# Patient Record
Sex: MALE | HISPANIC OR LATINO | Employment: FULL TIME | URBAN - METROPOLITAN AREA
[De-identification: names, ages, dates, MRNs, and addresses within clinical notes are randomized per-mention and may not be internally consistent; named-entity substitution may affect disease eponyms.]

---

## 2024-02-08 ENCOUNTER — HOSPITAL ENCOUNTER (OUTPATIENT)
Dept: LAB | Facility: MEDICAL CENTER | Age: 19
End: 2024-02-08
Payer: COMMERCIAL

## 2024-02-08 ENCOUNTER — HOSPITAL ENCOUNTER (OUTPATIENT)
Facility: MEDICAL CENTER | Age: 19
End: 2024-02-08
Payer: COMMERCIAL

## 2024-02-08 ENCOUNTER — OFFICE VISIT (OUTPATIENT)
Dept: MEDICAL GROUP | Facility: CLINIC | Age: 19
End: 2024-02-08
Payer: COMMERCIAL

## 2024-02-08 VITALS
OXYGEN SATURATION: 98 % | TEMPERATURE: 97.6 F | HEART RATE: 90 BPM | BODY MASS INDEX: 26.84 KG/M2 | HEIGHT: 66 IN | WEIGHT: 167 LBS

## 2024-02-08 DIAGNOSIS — Z20.2 STD EXPOSURE: ICD-10-CM

## 2024-02-08 LAB
HCV AB SER QL: NORMAL
HIV 1+2 AB+HIV1 P24 AG SERPL QL IA: NORMAL

## 2024-02-08 PROCEDURE — 99213 OFFICE O/P EST LOW 20 MIN: CPT | Mod: GE

## 2024-02-08 PROCEDURE — 86803 HEPATITIS C AB TEST: CPT

## 2024-02-08 PROCEDURE — 87591 N.GONORRHOEAE DNA AMP PROB: CPT

## 2024-02-08 PROCEDURE — 36415 COLL VENOUS BLD VENIPUNCTURE: CPT

## 2024-02-08 PROCEDURE — 87389 HIV-1 AG W/HIV-1&-2 AB AG IA: CPT

## 2024-02-08 PROCEDURE — 87491 CHLMYD TRACH DNA AMP PROBE: CPT

## 2024-02-08 ASSESSMENT — PATIENT HEALTH QUESTIONNAIRE - PHQ9: CLINICAL INTERPRETATION OF PHQ2 SCORE: 0

## 2024-02-08 NOTE — PROGRESS NOTES
"Subjective:     CC: STD exposure    HPI:   Basil presents today regarding possible STD.  Patient reports that his current partner has tested positive twice now for chlamydia.  She has been treated both times.  There is concern that patient has chlamydia which she has been giving his patient twice as he has not had testing or recent treatment.  He denies any symptoms of genital lesions or urethral discharge.  Denies fever or chills.  He denies any other complaints at this time.    ROS:  Gen: no fevers/chills, no changes in weight  Pulm: no sob, no cough  CV: no chest pain, no palpitations  GI: no nausea/vomiting, no diarrhea    Objective:     Exam:  Pulse 90   Temp 36.4 °C (97.6 °F)   Ht 1.676 m (5' 6\")   Wt 75.8 kg (167 lb)   SpO2 98%   BMI 26.95 kg/m²  Body mass index is 26.95 kg/m².    Gen: Alert and oriented, No apparent distress.  Neck: Neck is supple without lymphadenopathy.  Lungs: Normal effort, CTA bilaterally, no wheezes, rhonchi, or rales  CV: Regular rate and rhythm. No murmurs, rubs, or gallops.  Ext: No clubbing, cyanosis, edema.    Assessment & Plan:     18 y.o. male with the following -     1. STD exposure  Patient with possible STD exposure.  Obtained labs including chlamydia, gonorrhea, HIV, hep C testing.  Discussed with patient once results have been obtained will inform patient and treat as needed.  Advised to use protection until results are known.  - Chlamydia/GC, PCR (Urine); Future  - HIV AG/AB COMBO ASSAY SCREENING; Future  - HEP C VIRUS ANTIBODY; Future     Follow-up as needed or for annual visit    Nahomy Jamison M.D.  PGY-2         "

## 2024-02-10 LAB
C TRACH DNA SPEC QL NAA+PROBE: POSITIVE
N GONORRHOEA DNA SPEC QL NAA+PROBE: NEGATIVE
SPECIMEN SOURCE: ABNORMAL

## 2024-02-12 ENCOUNTER — TELEPHONE (OUTPATIENT)
Dept: MEDICAL GROUP | Facility: CLINIC | Age: 19
End: 2024-02-12
Payer: COMMERCIAL

## 2024-02-12 DIAGNOSIS — Z20.2 STD EXPOSURE: ICD-10-CM

## 2024-02-12 RX ORDER — DOXYCYCLINE HYCLATE 100 MG
100 TABLET ORAL 2 TIMES DAILY
Qty: 14 TABLET | Refills: 0 | Status: SHIPPED | OUTPATIENT
Start: 2024-02-12 | End: 2024-02-19

## 2024-02-29 ENCOUNTER — TELEPHONE (OUTPATIENT)
Dept: MEDICAL GROUP | Facility: CLINIC | Age: 19
End: 2024-02-29
Payer: COMMERCIAL

## 2024-02-29 NOTE — TELEPHONE ENCOUNTER
----- Message from Nahomy Terry M.D. sent at 2/29/2024  8:43 AM PST -----  Regarding: RE: Results  Hello,     Yes I did attempt calling twice it is their home phone number.The mom kept calling but I told Glendy that I can not talk to her unless the patient signs a release of information.   ----- Message -----  From: Lazara Sagastume  Sent: 2/23/2024  10:03 AM PST  To: Nahomy Terry M.D.  Subject: Results                                          Hi , Dr terry  Results for Chlamydia are available in the chart. Can you Contac patient with Results.  Thank you !

## 2024-03-02 ENCOUNTER — APPOINTMENT (OUTPATIENT)
Dept: RADIOLOGY | Facility: MEDICAL CENTER | Age: 19
End: 2024-03-02
Attending: EMERGENCY MEDICINE
Payer: COMMERCIAL

## 2024-03-02 ENCOUNTER — HOSPITAL ENCOUNTER (EMERGENCY)
Facility: MEDICAL CENTER | Age: 19
End: 2024-03-02
Attending: EMERGENCY MEDICINE
Payer: COMMERCIAL

## 2024-03-02 VITALS
RESPIRATION RATE: 16 BRPM | TEMPERATURE: 98.2 F | SYSTOLIC BLOOD PRESSURE: 134 MMHG | WEIGHT: 167.11 LBS | HEIGHT: 71 IN | HEART RATE: 95 BPM | DIASTOLIC BLOOD PRESSURE: 72 MMHG | OXYGEN SATURATION: 95 % | BODY MASS INDEX: 23.4 KG/M2

## 2024-03-02 DIAGNOSIS — M25.571 ACUTE RIGHT ANKLE PAIN: ICD-10-CM

## 2024-03-02 DIAGNOSIS — M25.471 ANKLE EFFUSION, RIGHT: ICD-10-CM

## 2024-03-02 PROCEDURE — 73610 X-RAY EXAM OF ANKLE: CPT | Mod: RT

## 2024-03-02 PROCEDURE — 99283 EMERGENCY DEPT VISIT LOW MDM: CPT

## 2024-03-02 RX ORDER — NAPROXEN 500 MG/1
500 TABLET ORAL 2 TIMES DAILY WITH MEALS
Qty: 20 TABLET | Refills: 0 | Status: SHIPPED | OUTPATIENT
Start: 2024-03-02

## 2024-03-03 ENCOUNTER — HOSPITAL ENCOUNTER (EMERGENCY)
Facility: MEDICAL CENTER | Age: 19
End: 2024-03-03
Attending: EMERGENCY MEDICINE
Payer: COMMERCIAL

## 2024-03-03 VITALS
RESPIRATION RATE: 16 BRPM | OXYGEN SATURATION: 98 % | BODY MASS INDEX: 23.24 KG/M2 | WEIGHT: 166 LBS | SYSTOLIC BLOOD PRESSURE: 116 MMHG | TEMPERATURE: 98.1 F | HEIGHT: 71 IN | HEART RATE: 64 BPM | DIASTOLIC BLOOD PRESSURE: 70 MMHG

## 2024-03-03 VITALS
OXYGEN SATURATION: 96 % | RESPIRATION RATE: 15 BRPM | HEART RATE: 75 BPM | HEIGHT: 71 IN | TEMPERATURE: 98.4 F | WEIGHT: 166.01 LBS | BODY MASS INDEX: 23.24 KG/M2 | SYSTOLIC BLOOD PRESSURE: 121 MMHG | DIASTOLIC BLOOD PRESSURE: 82 MMHG

## 2024-03-03 DIAGNOSIS — G89.29 CHRONIC PAIN OF RIGHT ANKLE: ICD-10-CM

## 2024-03-03 DIAGNOSIS — M25.471 RIGHT ANKLE SWELLING: ICD-10-CM

## 2024-03-03 DIAGNOSIS — M25.471 EFFUSION OF RIGHT ANKLE: ICD-10-CM

## 2024-03-03 DIAGNOSIS — M25.571 CHRONIC PAIN OF RIGHT ANKLE: ICD-10-CM

## 2024-03-03 LAB
APPEARANCE FLD: NORMAL
BODY FLD TYPE: NORMAL
COLOR FLD: YELLOW
CRYSTALS FLD MICRO: NORMAL
GLUCOSE FLD-MCNC: 90 MG/DL
GRAM STN SPEC: NORMAL
LYMPHOCYTES NFR FLD: 22 %
MONOS+MACROS NFR FLD MANUAL: 4 %
NEUTROPHILS NFR FLD: 75 %
NUC CELL # FLD: 824 CELLS/UL
RBC # FLD: NORMAL CELLS/UL
SIGNIFICANT IND 70042: NORMAL
SITE SITE: NORMAL
SOURCE SOURCE: NORMAL

## 2024-03-03 PROCEDURE — 99283 EMERGENCY DEPT VISIT LOW MDM: CPT

## 2024-03-03 PROCEDURE — 87205 SMEAR GRAM STAIN: CPT

## 2024-03-03 PROCEDURE — 20605 DRAIN/INJ JOINT/BURSA W/O US: CPT

## 2024-03-03 PROCEDURE — 89060 EXAM SYNOVIAL FLUID CRYSTALS: CPT

## 2024-03-03 PROCEDURE — 82945 GLUCOSE OTHER FLUID: CPT

## 2024-03-03 PROCEDURE — 89051 BODY FLUID CELL COUNT: CPT

## 2024-03-03 PROCEDURE — 87070 CULTURE OTHR SPECIMN AEROBIC: CPT

## 2024-03-03 NOTE — DISCHARGE INSTRUCTIONS
Did receive a call from [facility] tomorrow to get a urgent clinic appointment with one of their ankle experts.

## 2024-03-03 NOTE — ED NOTES
Right ankle pain 10/10, pt states he takes acetaminophen for pain but its not helping, pt states his swelling is getting worse on the medial side of ankle.  Cms intact.

## 2024-03-03 NOTE — ED NOTES
.Pt stable for discharge. Pt educated and reviewed discharge instructions with RN. Pt verbalized understanding & all questions were answered. Pt AoX 4. Pt ambulated independently with balanced and steady gait with personal walking boot out of the ED with all belongings. Pt encouraged to come back if symptoms worsen.

## 2024-03-03 NOTE — ED TRIAGE NOTES
"18 yr old male to triage via wheel chair  Chief Complaint   Patient presents with    Ankle Pain     Patient report he twisted his right ankle two weeks ago and was told everything \"was fine and placed in a walking boot\" at the [facility].  Patient report ankle not getting any better.       /79   Pulse 88   Temp 36.8 °C (98.2 °F) (Temporal)   Resp 18   Ht 1.803 m (5' 11\")   Wt 75.8 kg (167 lb 1.7 oz)   SpO2 97%   BMI 23.31 kg/m²     Patient is requesting an MRI to be done.   "

## 2024-03-03 NOTE — ED PROVIDER NOTES
"ED Provider Note    CHIEF COMPLAINT  Chief Complaint   Patient presents with    Ankle Pain     Patient report he twisted his right ankle two weeks ago and was told everything \"was fine and placed in a walking boot\" at the [facility].  Patient report ankle not getting any better.         EXTERNAL RECORDS REVIEWED  2/17/2024, [facility]: Right ankle sprain  2/22/2024, [facility]: Right ankle sprain and extensor hallux longus tendon synovitis  2/29/2024, [facility]: Right ankle sprain and extensor hallux longus tendon synovitis    HPI/ROS  LIMITATION TO HISTORY   Select: : None  OUTSIDE HISTORIAN(S):  Family member at the bedside    Basil Caldwell is a 18 y.o. male who presents for evaluation of ongoing right ankle pain.  A couple weeks ago he rolled his ankle, he has been to the [facility] 3 times since then, he has been placed on a Medrol Dosepak, he has been treated with a walking boot.  Patient's family member at the bedside reports that after the second visit they told him if there was no improvement by the third visit an MRI of his ankle was going to be obtained.  He reports there has been no improvement after the Medrol Dosepak and immobilization in the walking boot, the pain persists, unable to walk on it, followed up with the [facility] 2 days ago and was instructed to remove the walking boot and get back to normal activity with pain being his guide.  He states he is unable to walk on it.  No fever.  No weakness or numbness.  He has been using OTC analgesia.    PAST MEDICAL HISTORY   has a past medical history of PNA (pneumonia).    SURGICAL HISTORY  patient denies any surgical history    FAMILY HISTORY  History reviewed. No pertinent family history.    SOCIAL HISTORY  Social History     Tobacco Use    Smoking status: Never    Smokeless tobacco: Not on file   Vaping Use    Vaping Use: Never used   Substance and Sexual Activity    Alcohol use: No    Drug use: No    Sexual activity: Not on file " "      CURRENT MEDICATIONS  Home Medications       Reviewed by Rashmi Cheng R.N. (Registered Nurse) on 03/02/24 at 1626  Med List Status: Complete     Medication Last Dose Status        Patient Marco Antonio Taking any Medications                           ALLERGIES  No Known Allergies    PHYSICAL EXAM  VITAL SIGNS: /79   Pulse 88   Temp 36.8 °C (98.2 °F) (Temporal)   Resp 18   Ht 1.803 m (5' 11\")   Wt 75.8 kg (167 lb 1.7 oz)   SpO2 97%   BMI 23.31 kg/m²    Constitutional: Alert in no apparent distress.  HENT: No signs of significant acute trauma.   Eyes: Conjunctiva normal, non-icteric.   Chest: Normal nonlabored respirations  Skin: No appreciable rash on the exposed skin  Musculoskeletal: Obvious edema involving the region of the right medial malleolus, tender on palpation but no overlying erythema or other skin changes appreciated.  Distally neurovascularly intact with a normal dorsalis pedis pulse, normal posterior tibial pulse and normal sensation throughout the foot.  Limited range of motion secondary to pain.  Neurologic: Alert, no obvious focal deficits noted.        DIAGNOSTIC STUDIES / PROCEDURES    RADIOLOGY  I have independently interpreted the diagnostic imaging associated with this visit and am waiting the final reading from the radiologist.   My preliminary interpretation is as follows: No obvious osseous abnormality  Radiologist interpretation:   DX-ANKLE 3+ VIEWS RIGHT   Final Result      1.  No fracture or malalignment of the right ankle.   2.  There is diffuse soft tissue swelling with a possible small joint effusion.        COURSE & MEDICAL DECISION MAKING    ED Observation Status? No; Patient does not meet criteria for ED Observation.     INITIAL ASSESSMENT, COURSE AND PLAN  Care Narrative: 18-year-old male with ongoing right ankle pain, has been evaluated 3 times at the [facility], treated with steroid pack and immobilization, has ongoing pain, diagnosed with tenosynovitis of " the extensor hallux longus tendon.  Neurovascular intact on exam.  I repeated an x-ray here which gave no indication of any callus formation that could have suggested an occult fracture initially missed.  At this point, I will attempt to order an outpatient MRI for him to obtain prior to another follow-up at the [facility].  Will prescribe him naproxen that will place his OTC NSAIDs.  He will follow-up with the [facility].  Prescription for naproxen      FINAL DIAGNOSIS  1. Acute right ankle pain    2. Ankle effusion, right           Electronically signed by: Salvatore Villavicencio M.D., 3/2/2024 4:53 PM

## 2024-03-03 NOTE — ED TRIAGE NOTES
"Chief Complaint   Patient presents with    Sent by MD     Pt sent by ortho team to come back this afternoon to get his R ankle tapped       Pt BIB family for above. Pt seen here earlier today and was told to come back and be seen by ortho. Pt aox4 gcs 15    /65   Pulse 96   Temp 36.9 °C (98.4 °F)   Resp 16   Ht 1.803 m (5' 11\")   Wt 75.3 kg (166 lb)   SpO2 96%   BMI 23.15 kg/m²     "

## 2024-03-03 NOTE — ED PROVIDER NOTES
"  CHIEF COMPLAINT  Chief Complaint   Patient presents with    Ankle Injury     Patient reports was seen two weeks ago at [facility] and given crutches and a tall walking boot but pain and swelling of the right ankle has become increased. Patient also seen at [facility] yesterday.        LIMITATION TO HISTORY   None.    HPI    Basil Caldwell is a 18 y.o. male   Has been seen by the [facility] several times in February.  ER visit [facility] yesterday  With history of inverted ankle at work  Comes in with  2 issues 1 right ankle pain.  His ankle is severe pain patient states worse when he puts weight on it.  Then he keeps it still.  It is not relieved with over-the-counter medication.  And has been constant throbbing for 2 weeks.    Requesting further testing.  This by the mother she states that \"can you do a CT scan or an MRI or something\".  States that they given MRI in a month that was scheduled by her [facility].  Mom states that the pain has been too great has been wearing his boot but he continues have pain and therefore they are concerned.    Reviewed the chart  Seen at the breast.  Patient was seen by the man expressed them by the foot clinic twice.  Put on Medrol Dosepak there is mention of MRI.  Since seen by my partner.  Patient had repeat x-ray of the for delayed fracture.  None noted.  MRI outpatient was ordered    Single and reviewed the chart patient was positive for chlamydia.  He states that he was treated.    OUTSIDE HISTORIAN(S):  Mother is at the bedside.    EXTERNAL RECORDS REVIEWED       Assessment & Plan   18-year-old male with right lower extremity severe ankle sprain and extensor hallux longus tendon synovitis.     Plan: At this time, we will trial the patient on a Medrol Dosepak to see if this brings down the pain and inflammation and helps with the function of the EHL.  If we find that this is not helping with the function of EHL, we will be pursuing MRI.  While on the Medrol " "Dosepak he is not to take any other NSAIDs, but Tylenol is okay for breakthrough pain.  Will also place him into a tall walking boot for immobilization as he has a significant amount of pain and swelling.  Will see him back in 1 week for reevaluation.  No need for x-rays at that time.    REVIEW OF SYSTEMS  None    PAST MEDICAL HISTORY  Past Medical History:   Diagnosis Date    PNA (pneumonia)     at 6 months of age       FAMILY HISTORY  History reviewed. No pertinent family history.    SOCIAL HISTORY  Social History     Tobacco Use    Smoking status: Never   Vaping Use    Vaping Use: Never used   Substance Use Topics    Alcohol use: No    Drug use: No     Social History     Substance and Sexual Activity   Drug Use No       SURGICAL HISTORY  History reviewed. No pertinent surgical history.    CURRENT MEDICATIONS  No current facility-administered medications for this encounter.    Current Outpatient Medications:     naproxen (NAPROSYN) 500 MG Tab, Take 1 Tablet by mouth 2 times a day with meals., Disp: 20 Tablet, Rfl: 0    ALLERGIES  No Known Allergies    PHYSICAL EXAM  VITAL SIGNS: /75   Pulse 81   Temp 36.7 °C (98.1 °F) (Temporal)   Resp 15   Ht 1.803 m (5' 11\")   Wt 75.3 kg (166 lb 0.1 oz)   SpO2 96%   BMI 23.15 kg/m²   Reviewed and noted  Constitutional: Well developed, Well nourished, no acute distress.  HENT: Normocephalic, atraumatic, bilateral external ears normal, No intraoral erythema, edema, exudate  Eyes: PERRLA, conjunctiva pink, no scleral icterus.   Cardiovascular: Pedal pulse extremities warm to touch    Skin: No erythema, lacerations or abrasions on the ankle  Genitourinary: No costovertebral angle tenderness.   Musculoskeletal: Swelling of his ankle.  He is unable to flex or extend it.  Patient's knee upon palpation has no pain.  When it inverted and everted there appears medial laxity there is negative anterior drawer sign.  There is swelling of both the medial and lateral malleolus " with tenderness only on the medial malleolus on the inferior portion  Neurologic: Sensation is noted light touch on his toes        MEDICAL DECISION MAKING:  PROBLEMS EVALUATED THIS VISIT:  Ankle pain.  Chronic.  2 weeks to 3 weeks now with severe.  With 5 visits.  Neurovasc intact with swelling of the ankle joint with stability no fevers no chills.  Chlamydia infection.  Treated as per patient         PLAN:  Discussion with orthopedist  Discharge  Prompt follow-up  Anticipatory guidance with family    RESULTS    LABS Ordered and Reviewed by Me:  Results for orders placed or performed during the hospital encounter of 02/08/24   Chlamydia/GC, PCR (Urine)    Specimen: Urine   Result Value Ref Range    C. trachomatis by PCR POSITIVE (A) Negative    Gc By Dna Probe Negative Negative    Source Urine          ED COURSE:      CONSULTANTS/OTHER GROUPS CONTACTED    Discussed the case with the orthopedic surgeon on-call.  Initially we discussed this could be done as an outpatient he agreed the patient will be seen by one of our foot specialist promptly as the patient does need follow-up.        FINAL DISPO PLAN   Charge home  Follow-up with orthopedics quickly.  Recommending sooner imaging and then later as per orthopedist to decide.      Followup:  Kwabena Gunderson M.D.  [address]  [facility] [address]  [phone]    Call         CONDITION: Stable.     FINAL IMPRESSION  1. Chronic pain of right ankle        Addendum  Upon discussion this further with the orthopedist we talked about flexion extension outpatient had a full joint.  At this point the orthopedist and I discussed the case further and felt best that the patient would come back for aspiration.  At this point I called back the patient he will come back to the ER notify my partner and at this point the partner will then hopefully notified the PA on-call to aspirate the joint for evaluation and possible relief.

## 2024-03-03 NOTE — ED TRIAGE NOTES
Chief Complaint   Patient presents with    Ankle Injury     Patient reports was seen two weeks ago at [facility] and given crutches and a tall walking boot but pain and swelling of the right ankle has become increased. Patient also seen at [facility] yesterday.

## 2024-03-03 NOTE — ED NOTES
.Bedside report received from off going RN/tech: Zachary, assumed care of patient.  POC discussed with patient. Call light within reach, all needs addressed at this time.       Fall risk interventions in place: Move the patient closer to the nurse's station, Patient's personal possessions are with in their safe reach, Place socks on patient, Place fall risk sign on patient's door, Give patient urinal if applicable, Keep floor surfaces clean and dry, and Accompanied to restroom (all applicable per [place] Fall risk assessment)   Continuous monitoring: Pulse Ox or Blood Pressure  IVF/IV medications: Not Applicable   Oxygen: Room Air  Bedside sitter: Not Applicable   Isolation: Not Applicable

## 2024-03-04 NOTE — DISCHARGE INSTRUCTIONS
You are seen in the emergency department for right ankle swelling.  Fluid analysis showed no evidence of acute infection.    Take Tylenol ibuprofen to help with your pain.  Keep your ankle elevated.  Follow-up with orthopedic surgery.

## 2024-03-04 NOTE — ED NOTES
Bedside report received from off going RN: Evy, assumed care of patient.  POC discussed with patient. Call light within reach, all needs addressed at this time.       Fall risk interventions in place: Not Applicable (all applicable per [place] Fall risk assessment)   Continuous monitoring: Pulse Ox or Blood Pressure  IVF/IV medications: Not Applicable   Oxygen: Room Air  Bedside sitter: Not Applicable   Isolation: Not Applicable

## 2024-03-04 NOTE — DISCHARGE SUMMARY
"  ED Observation Discharge Summary    Patient:Basil Caldwell  Patient : 2005  Patient MRN: [id number]  Patient PCP: Nahomy Jamison M.D.    Admit Date: 3/3/2024  Discharge Date and Time: 24 7:53 PM  Discharge Diagnosis: Musculoskeletal ankle pain, ankle effusion  Discharge Attending: Nathaniel Yun D.O.  Discharge Service: ED Observation    ED Course  Basil is a 18 y.o. male who was evaluated at [facility] as referred by orthopedic surgery for evaluation of possible septic arthritis.  He underwent arthrocentesis earlier today by Dr. Allen, at [facility], was pending cell count, Gram stain to evaluate for septic arthritis.  Cell count reveals 824 nucleated cells, 75% polys, not consistent with septic arthritis.  Gram stain shows rare white blood cells, no organisms.        Discharge Exam:  /81   Pulse 63   Temp 36.9 °C (98.4 °F)   Resp 16   Ht 1.803 m (5' 11\")   Wt 75.3 kg (166 lb)   SpO2 98%   BMI 23.15 kg/m² .    General: Well- appearing , non-toxic, no acute distress  Neuro: oriented x 3, moving all extremities.   HEENT:   - Head: Normocephalic, atraumatic  - Eyes: PERRL  - Ears/Nose: normal external nose and ears  - Mouth: Normal external exam  Resp: no increased work of breathing  CV: Perfusing well  Abd: Not vomiting, no apparent abdominal discomfort  Extremities: No peripheral edema  Right lower extremity:   Swelling to the ankle, warm and well-perfused foot, no erythema  Skin: Not diaphoretic  Psych: lucid and conversational             Labs  Results for orders placed or performed during the hospital encounter of 24   FLUID CELL COUNT   Result Value Ref Range    Fluid Type Synovial     Color-Body Fluid Yellow     Character-Body Fluid Cloudy     Total RBC Count 01534 cells/uL    FL Total Nucleated Cells 824 cells/uL    Polys 75 %    Lymphs 22 %    Fl Mono Macrophages 4 %   FLUID GLUCOSE   Result Value Ref Range    Fluid Type Synovial     Glucose, Fluid 90 mg/dL   FLUID CULTURE W/GRAM " STAIN    Specimen: Other Body Fluid; Synovial   Result Value Ref Range    Significant Indicator NEG     Source SYNO     Site R Ankle     Culture Result -     Gram Stain Result Rare WBCs.  No organisms seen.      FLUID CRYSTALS   Result Value Ref Range    Fluid Type Synovial     Crystals, Body Fluid None Seen None Seen   GRAM STAIN    Specimen: Synovial   Result Value Ref Range    Significant Indicator .     Source SYNO     Site R Ankle     Gram Stain Result Rare WBCs.  No organisms seen.          Radiology  No orders to display       Medications:   New Prescriptions    No medications on file       My final assessment includes musculoskeletal ankle pain  Upon Reevaluation, the patient's condition has: not improved; and will be escalated to hospitalization.    Patient discharged from ED Observation status at 8:10 PM on 3/3/2024    Total time spent on this ED Observation discharge encounter is < 30 Minutes    Electronically signed by: Nathaniel Yun D.O., 3/3/2024 7:53 PM

## 2024-03-04 NOTE — ED PROVIDER NOTES
"ED Provider Note        CHIEF COMPLAINT  Chief Complaint   Patient presents with    Sent by MD     Pt sent by ortho team to come back this afternoon to get his R ankle tapped         HPI    Basil Caldwell is a 18 y.o. male who presents to the Emergency Department after being called back for aspiration of the joint.  The patient reportedly injured his ankle approximately 2 weeks ago.  He was seen at [facility] and was given crutches, however has been having ongoing pain.  He was seen earlier today, was discharged and called back for arthrocentesis.  The patient recently had a chlamydial illness, return plan is for arthrocentesis to rule out occult sepsis of the joint    REVIEW OF SYSTEMS  See HPI for further details. All other systems are negative.     PAST MEDICAL HISTORY     Past Medical History:   Diagnosis Date    PNA (pneumonia)     at 6 months of age       SURGICAL HISTORY  History reviewed. No pertinent surgical history.    FAMILY HISTORY  No family history on file.    SOCIAL HISTORY    reports that he has never smoked. He does not have any smokeless tobacco history on file. He reports that he does not drink alcohol and does not use drugs.    CURRENT MEDICATIONS  Home Medications       Reviewed by Sade Lugo R.N. (Registered Nurse) on 03/03/24 at 1542  Med List Status: Not Addressed     Medication Last Dose Status   naproxen (NAPROSYN) 500 MG Tab  Active                    ALLERGIES  No Known Allergies    PHYSICAL EXAM  VITAL SIGNS: /65   Pulse 96   Temp 36.9 °C (98.4 °F)   Resp 16   Ht 1.803 m (5' 11\")   Wt 75.3 kg (166 lb)   SpO2 96%   BMI 23.15 kg/m²   Gen: Alert, no acute distress  HEENT: ATNC  Eyes: PERRL, EOMI, normal conjunctiva  Neck: trachea midline  Resp: no respiratory distress  CV: No JVD, regular rate and rhythm  Abd: non-distended  Ext: No deformities.  2+ dorsalis pedis pulse right lower extremity.  Swelling of right ankle with limited dorsiflexion/plantarflexion. "  No erythema  Neuro: speech fluent    DIAGNOSTIC STUDIES / PROCEDURES    Procedure arthrocentesis  Indication: Concern for septic joint  Informed consent was obtained both verbally and in written form including risk, benefits, alternatives.    A an ultrasound was used to identify the correct location with a good pocket of fluid present in the lateral anterior ankle.  The site was marked, skin was cleansed with chlorhexidine, and approximately 2 mL of 0.5% bupivacaine were instilled for anesthesia.    The skin was recleansed with chlorhexidine, and using real-time ultrasound guidance, an 18-gauge needle was inserted into the joint space, with aspiration of a small amount of straw-colored tenacious fluid with a small amount of blood aspirated at the time of removal of the needle.  There was a small amount of bleeding from the needle site.  The patient tolerated the procedure well.  Images of the ultrasound obtained and haiku as below:    Pen is gesturing towards needle tip on image      LABS  Labs Reviewed   JOINT INFECTION PANEL   FLUID CELL COUNT   FLUID GLUCOSE   FLUID CULTURE W/GRAM STAIN   FLUID CRYSTALS         COURSE & MEDICAL DECISION MAKING  Pertinent Labs & Imaging studies were reviewed. (See chart for details)      EXTERNAL RECORDS REVIEWED  Outpatient Notes patient seen at orthopedic clinic 2/22, 2/29/2024 for injury to ankle      INITIAL ASSESSMENT AND PLAN  Care Narrative: Patient called back for arthrocentesis of the joint to rule out infection.  Discussed this with Dr. Gunderson, orthopedics, who agreed, recommended aspiration, cell count, PCR with plan for discharge home should the preliminary labs return reassuring.  Reconsult Ortho if washout is required.  Patient tolerated the arthrocentesis well.  He was signed out to the oncoming doctor awaiting results of the cell count    ADDITIONAL PROBLEM LIST AND DISPOSITION      I have discussed management of the patient with the following medical  professionals: See above    Escalation of care considered, and ultimately not performed: blood analysis and diagnostic imaging.       Decision tools and prescription drugs considered including, but not limited to: Antibiotics not currently indicated at the time of finishing my note .      FINAL IMPRESSION  1. Right ankle swelling      This dictation was created using voice recognition software. The accuracy of the dictation is limited to the abilities of the software. I expect there may be some errors of grammar and possibly content. The nursing notes were reviewed and certain aspects of this information were incorporated into this note.

## 2024-03-06 LAB
BACTERIA FLD AEROBE CULT: NORMAL
GRAM STN SPEC: NORMAL
SIGNIFICANT IND 70042: NORMAL
SITE SITE: NORMAL
SOURCE SOURCE: NORMAL

## 2024-03-27 ENCOUNTER — HOSPITAL ENCOUNTER (OUTPATIENT)
Dept: RADIOLOGY | Facility: MEDICAL CENTER | Age: 19
End: 2024-03-27
Attending: EMERGENCY MEDICINE
Payer: COMMERCIAL

## 2024-03-27 DIAGNOSIS — M25.571 ACUTE RIGHT ANKLE PAIN: ICD-10-CM

## 2024-03-27 DIAGNOSIS — M25.471 ANKLE EFFUSION, RIGHT: ICD-10-CM

## 2024-03-27 PROCEDURE — 73721 MRI JNT OF LWR EXTRE W/O DYE: CPT | Mod: RT

## 2024-05-06 ENCOUNTER — HOSPITAL ENCOUNTER (OUTPATIENT)
Dept: LAB | Facility: MEDICAL CENTER | Age: 19
End: 2024-05-06
Payer: COMMERCIAL

## 2024-05-06 ENCOUNTER — APPOINTMENT (OUTPATIENT)
Dept: MEDICAL GROUP | Facility: CLINIC | Age: 19
End: 2024-05-06
Payer: COMMERCIAL

## 2024-05-06 VITALS
BODY MASS INDEX: 25.34 KG/M2 | SYSTOLIC BLOOD PRESSURE: 95 MMHG | HEIGHT: 71 IN | WEIGHT: 181 LBS | OXYGEN SATURATION: 95 % | RESPIRATION RATE: 12 BRPM | DIASTOLIC BLOOD PRESSURE: 57 MMHG | HEART RATE: 63 BPM | TEMPERATURE: 97.9 F

## 2024-05-06 DIAGNOSIS — R04.0 EPISTAXIS, RECURRENT: ICD-10-CM

## 2024-05-06 LAB
BASOPHILS # BLD AUTO: 0.6 % (ref 0–1.8)
BASOPHILS # BLD: 0.03 K/UL (ref 0–0.12)
EOSINOPHIL # BLD AUTO: 0.23 K/UL (ref 0–0.51)
EOSINOPHIL NFR BLD: 4.4 % (ref 0–6.9)
ERYTHROCYTE [DISTWIDTH] IN BLOOD BY AUTOMATED COUNT: 43.5 FL (ref 35.9–50)
HCT VFR BLD AUTO: 35.9 % (ref 42–52)
HGB BLD-MCNC: 12 G/DL (ref 14–18)
IMM GRANULOCYTES # BLD AUTO: 0.01 K/UL (ref 0–0.11)
IMM GRANULOCYTES NFR BLD AUTO: 0.2 % (ref 0–0.9)
LYMPHOCYTES # BLD AUTO: 1.78 K/UL (ref 1–4.8)
LYMPHOCYTES NFR BLD: 34 % (ref 22–41)
MCH RBC QN AUTO: 32.1 PG (ref 27–33)
MCHC RBC AUTO-ENTMCNC: 33.4 G/DL (ref 32.3–36.5)
MCV RBC AUTO: 96 FL (ref 81.4–97.8)
MONOCYTES # BLD AUTO: 0.44 K/UL (ref 0–0.85)
MONOCYTES NFR BLD AUTO: 8.4 % (ref 0–13.4)
NEUTROPHILS # BLD AUTO: 2.74 K/UL (ref 1.82–7.42)
NEUTROPHILS NFR BLD: 52.4 % (ref 44–72)
NRBC # BLD AUTO: 0 K/UL
NRBC BLD-RTO: 0 /100 WBC (ref 0–0.2)
PLATELET # BLD AUTO: 323 K/UL (ref 164–446)
PMV BLD AUTO: 10.3 FL (ref 9–12.9)
RBC # BLD AUTO: 3.74 M/UL (ref 4.7–6.1)
WBC # BLD AUTO: 5.2 K/UL (ref 4.8–10.8)

## 2024-05-06 PROCEDURE — 99213 OFFICE O/P EST LOW 20 MIN: CPT | Mod: GE

## 2024-05-06 NOTE — PROGRESS NOTES
"Subjective:     CC: Epistaxis     HPI:   Basil presents today with chronic epistaxis. He is accompanied by his mother.  Patient reports that for the past 2 weeks he has had on and off again nosebleeding.  He says nosebleeds persist throughout the day and at times he will pull out small blood clots. Patient also reports bleeding has led to post nasal drainage.  He has been applying pressure but does not fully resolve his symptoms.  His last nosebleed episode was 2 days ago.  Does not use a humidifier at home or take allergy medication. Mother is concerned as patient appears more pale and fatigued.  Patient has history of nosebleeds but no other history of bleeding including gum bleeding. No family history of bleeding disorders.     Also discussed with patient follow up from last visit. Patient states he is comfortable discussing in front of his mom. He states that since being positive for chlamydia he did comply with taking doxycyline for full course and his partner was also treated.     Current Outpatient Medications Ordered in Epic   Medication Sig Dispense Refill    meloxicam (MOBIC) 15 MG tablet Take 1 Tablet by mouth every day. 30 Tablet 0    naproxen (NAPROSYN) 500 MG Tab Take 1 Tablet by mouth 2 times a day with meals. 20 Tablet 0     No current Epic-ordered facility-administered medications on file.     Objective:     Exam:  BP 95/57 (BP Location: Right arm, Patient Position: Sitting, BP Cuff Size: Adult)   Pulse 63   Temp 36.6 °C (97.9 °F) (Temporal)   Resp 12   Ht 1.803 m (5' 11\")   Wt 82.1 kg (181 lb)   SpO2 95%   BMI 25.24 kg/m²  Body mass index is 25.24 kg/m².    Gen: Alert and oriented, No apparent distress.  HEENT: bilateral nares enlarged and red however no active bleeding or dry blood present, no observable source of bleed  Lungs: Normal effort, CTA bilaterally, no wheezes, rhonchi, or rales  CV: Regular rate and rhythm. No murmurs, rubs, or gallops.  Ext: No clubbing, cyanosis, " edema.    Assessment & Plan:     18 y.o. male with the following -     1. Epistaxis, recurrent  Patient with recurrent episodes of epistaxis for the past two weeks. On exam no obvious source of bleeding, no active bleeding. No concerning hx for clotting disorder. Patient does appear pale on exam and mother feel he is more fatigued. Will get CBC as well as urgent referral to ENT. Also strongly advise patient and mother to go to the ER if nose bleeding persists despite applying pressure or applying afrin to the affected area.   - CBC WITH DIFFERENTIAL; Future  - Referral to ENT     Follow up as needed     Nahomy Jamison M.D.  PGY-2

## 2024-05-07 RX ORDER — OXYMETAZOLINE HYDROCHLORIDE 0.05 G/100ML
2 SPRAY NASAL 2 TIMES DAILY
Qty: 6 ML | Refills: 1 | Status: SHIPPED | OUTPATIENT
Start: 2024-05-07

## 2025-06-23 ENCOUNTER — HOSPITAL ENCOUNTER (OUTPATIENT)
Dept: RADIOLOGY | Facility: MEDICAL CENTER | Age: 20
End: 2025-06-23
Attending: PODIATRIST
Payer: COMMERCIAL
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
## 2025-06-23 DIAGNOSIS — M79.672 LEFT FOOT PAIN: ICD-10-CM

## 2025-06-23 DIAGNOSIS — M25.572 ARTHRALGIA OF LEFT ANKLE OR FOOT: ICD-10-CM

## 2025-06-23 DIAGNOSIS — M25.675: ICD-10-CM

## 2025-06-23 PROCEDURE — 73721 MRI JNT OF LWR EXTRE W/O DYE: CPT | Mod: LT
